# Patient Record
Sex: FEMALE | Race: WHITE
[De-identification: names, ages, dates, MRNs, and addresses within clinical notes are randomized per-mention and may not be internally consistent; named-entity substitution may affect disease eponyms.]

---

## 2018-04-15 ENCOUNTER — HOSPITAL ENCOUNTER (EMERGENCY)
Dept: HOSPITAL 25 - ED | Age: 53
Discharge: LEFT BEFORE DISCHARGE | End: 2018-04-15
Payer: COMMERCIAL

## 2018-04-15 DIAGNOSIS — M79.662: Primary | ICD-10-CM

## 2018-04-15 DIAGNOSIS — Z53.21: ICD-10-CM

## 2018-04-15 DIAGNOSIS — M79.661: ICD-10-CM

## 2018-04-15 LAB
HCT VFR BLD AUTO: 39 % (ref 35–47)
HGB BLD-MCNC: 13.7 G/DL (ref 12–16)
MCH RBC QN AUTO: 31 PG (ref 27–31)
MCHC RBC AUTO-ENTMCNC: 35 G/DL (ref 31–36)
MCV RBC AUTO: 89 FL (ref 80–97)
PLATELET # BLD AUTO: 228 10^3/UL (ref 150–450)
RBC # BLD AUTO: 4.4 10^6/UL (ref 4–5.4)
WBC # BLD AUTO: 3.4 10^3/UL (ref 3.5–10.8)

## 2018-04-15 PROCEDURE — 93005 ELECTROCARDIOGRAM TRACING: CPT

## 2018-04-15 PROCEDURE — 83735 ASSAY OF MAGNESIUM: CPT

## 2018-04-15 PROCEDURE — 80053 COMPREHEN METABOLIC PANEL: CPT

## 2018-04-15 PROCEDURE — 99283 EMERGENCY DEPT VISIT LOW MDM: CPT

## 2018-04-15 PROCEDURE — 85027 COMPLETE CBC AUTOMATED: CPT

## 2018-04-15 PROCEDURE — 36415 COLL VENOUS BLD VENIPUNCTURE: CPT

## 2018-04-15 PROCEDURE — 81003 URINALYSIS AUTO W/O SCOPE: CPT

## 2018-04-15 PROCEDURE — 84484 ASSAY OF TROPONIN QUANT: CPT

## 2018-04-15 PROCEDURE — 93970 EXTREMITY STUDY: CPT

## 2018-04-15 NOTE — RAD
HISTORY: Bilateral calf pain after a "long trip"



TECHNIQUE: Multiple transverse and longitudinal ultrasound images were obtained of the

veins of the bilateral lower extremities using grayscale, color Doppler, and spectral

Doppler imaging with and without compression and with augmentation. 



FINDINGS:



VEINS: The common femoral vein, deep femoral vein, femoral vein and popliteal vein are

compressible throughout their course, with normal flow on color Doppler imaging and normal

response to augmentation on spectral Doppler imaging.



SOFT TISSUES: Grossly normal. No large popliteal fossa cyst was identified.



IMPRESSION: 

No sonographic evidence of deep vein thrombosis.

## 2018-05-12 ENCOUNTER — HOSPITAL ENCOUNTER (EMERGENCY)
Dept: HOSPITAL 25 - ED | Age: 53
Discharge: HOME | End: 2018-05-12
Payer: COMMERCIAL

## 2018-05-12 VITALS — DIASTOLIC BLOOD PRESSURE: 62 MMHG | SYSTOLIC BLOOD PRESSURE: 112 MMHG

## 2018-05-12 DIAGNOSIS — Z88.6: ICD-10-CM

## 2018-05-12 DIAGNOSIS — R74.0: ICD-10-CM

## 2018-05-12 DIAGNOSIS — R79.1: ICD-10-CM

## 2018-05-12 DIAGNOSIS — Z88.3: ICD-10-CM

## 2018-05-12 DIAGNOSIS — R10.13: ICD-10-CM

## 2018-05-12 DIAGNOSIS — R17: Primary | ICD-10-CM

## 2018-05-12 DIAGNOSIS — Z88.5: ICD-10-CM

## 2018-05-12 LAB
BASOPHILS # BLD AUTO: 0 10^3/UL (ref 0–0.2)
EOSINOPHIL # BLD AUTO: 0 10^3/UL (ref 0–0.6)
HCT VFR BLD AUTO: 39 % (ref 35–47)
HGB BLD-MCNC: 13.6 G/DL (ref 12–16)
INR PPP/BLD: 1.07 (ref 0.77–1.02)
LYMPHOCYTES # BLD AUTO: 0.4 10^3/UL (ref 1–4.8)
MCH RBC QN AUTO: 31 PG (ref 27–31)
MCHC RBC AUTO-ENTMCNC: 35 G/DL (ref 31–36)
MCV RBC AUTO: 89 FL (ref 80–97)
MONOCYTES # BLD AUTO: 0.4 10^3/UL (ref 0–0.8)
NEUTROPHILS # BLD AUTO: 5.4 10^3/UL (ref 1.5–7.7)
NRBC # BLD AUTO: 0 10^3/UL
NRBC BLD QL AUTO: 0
PLATELET # BLD AUTO: 219 10^3/UL (ref 150–450)
RBC # BLD AUTO: 4.36 10^6/UL (ref 4–5.4)
WBC # BLD AUTO: 6.2 10^3/UL (ref 3.5–10.8)

## 2018-05-12 PROCEDURE — 80053 COMPREHEN METABOLIC PANEL: CPT

## 2018-05-12 PROCEDURE — 86308 HETEROPHILE ANTIBODY SCREEN: CPT

## 2018-05-12 PROCEDURE — 86140 C-REACTIVE PROTEIN: CPT

## 2018-05-12 PROCEDURE — 87086 URINE CULTURE/COLONY COUNT: CPT

## 2018-05-12 PROCEDURE — 81003 URINALYSIS AUTO W/O SCOPE: CPT

## 2018-05-12 PROCEDURE — 83880 ASSAY OF NATRIURETIC PEPTIDE: CPT

## 2018-05-12 PROCEDURE — 74177 CT ABD & PELVIS W/CONTRAST: CPT

## 2018-05-12 PROCEDURE — 93005 ELECTROCARDIOGRAM TRACING: CPT

## 2018-05-12 PROCEDURE — 84443 ASSAY THYROID STIM HORMONE: CPT

## 2018-05-12 PROCEDURE — 86665 EPSTEIN-BARR CAPSID VCA: CPT

## 2018-05-12 PROCEDURE — 71275 CT ANGIOGRAPHY CHEST: CPT

## 2018-05-12 PROCEDURE — 85379 FIBRIN DEGRADATION QUANT: CPT

## 2018-05-12 PROCEDURE — 83690 ASSAY OF LIPASE: CPT

## 2018-05-12 PROCEDURE — 85025 COMPLETE CBC W/AUTO DIFF WBC: CPT

## 2018-05-12 PROCEDURE — 86664 EPSTEIN-BARR NUCLEAR ANTIGEN: CPT

## 2018-05-12 PROCEDURE — 82553 CREATINE MB FRACTION: CPT

## 2018-05-12 PROCEDURE — 85610 PROTHROMBIN TIME: CPT

## 2018-05-12 PROCEDURE — 36415 COLL VENOUS BLD VENIPUNCTURE: CPT

## 2018-05-12 PROCEDURE — 96361 HYDRATE IV INFUSION ADD-ON: CPT

## 2018-05-12 PROCEDURE — 99284 EMERGENCY DEPT VISIT MOD MDM: CPT

## 2018-05-12 PROCEDURE — 81015 MICROSCOPIC EXAM OF URINE: CPT

## 2018-05-12 PROCEDURE — 82550 ASSAY OF CK (CPK): CPT

## 2018-05-12 PROCEDURE — 83735 ASSAY OF MAGNESIUM: CPT

## 2018-05-12 PROCEDURE — 85730 THROMBOPLASTIN TIME PARTIAL: CPT

## 2018-05-12 PROCEDURE — 80074 ACUTE HEPATITIS PANEL: CPT

## 2018-05-12 PROCEDURE — 83605 ASSAY OF LACTIC ACID: CPT

## 2018-05-12 PROCEDURE — 84484 ASSAY OF TROPONIN QUANT: CPT

## 2018-05-12 PROCEDURE — 96374 THER/PROPH/DIAG INJ IV PUSH: CPT

## 2018-05-12 RX ADMIN — SODIUM CHLORIDE ONE MLS/HR: 900 IRRIGANT IRRIGATION at 18:26

## 2018-05-12 NOTE — ED
Jeanine CROCKER Elizabeth, scribed for Savanna Marie MD on 05/12/18 at 1532 .





Abdominal Pain/Female





- HPI Summary


HPI Summary: 


This patient is a 52 year [facility] F [facility]  

with a chief complaint of severe, burning epigastric abd pain since 09:00 this 

morning. The patient rates the pain 9/10 in severity. Pt states that the pain 

was so severe she almost passed out and that she was screaming and crying in 

pain. Symptoms aggravated by nothing. Symptoms alleviated by lying down. 

Patient notes the abd pain feels acidic. Patient reports that she has had an 

episode like this before in 2015 and her LFT's were elevated. Pt is s/p bailey. 

Patient reports fever, nausea, mid-back pain, tremors, and shortness of breath. 

Patient denies changes in appetite and notes she was able to eat this morning. 

Patient notes that she took Prilosec, Zantac and Mylanta earlier today but her 

symptoms did not nora. Patient notes that she commonly gets heart palpitations 

but the cardiac stress test she took 5 years ago was normal. Patient notes that 

her most recent colonoscopy (6 years ago) was normal.








- History of Current Complaint


Chief Complaint: EDAbdPain


Stated Complaint: ABD PAIN


Time Seen by Provider: 05/12/18 15:04


Hx Obtained From: Patient, Medical Records - 2015 [facility] records, Dr. Sosa 4/ 2018 record


Pregnant?: No


Onset/Duration: Gradual Onset, Lasting Hours, Still Present


Timing: Constant


Severity Initially: Moderate


Severity Currently: Severe


Pain Intensity: 9


Pain Scale Used: 0-10 Numeric


Location: Epigastric


Radiates: Yes


Radiates to: Back


Character: Sharp, Burning


Aggravating Factor(s): Nothing


Alleviating Factor(s): Nothing


Associated Signs and Symptoms: Positive: Fever, Back Pain, Nausea, Other: - SOB

, tremors


Allergies/Adverse Reactions: 


 Allergies











Allergy/AdvReac Type Severity Reaction Status Date / Time


 


Adhesive Tape Allergy  Hives Verified 05/12/18 14:32


 


erythromycin base Allergy  Nausea And Verified 05/12/18 14:32





   Vomiting  


 


morphine Allergy  GI Upset Verified 05/12/18 14:32


 


NSAIDS (Non-Steroidal Allergy  GI Upset Verified 05/12/18 14:32





Anti-Inflamma     











Home Medications: 


 Home Medications





Cholecalciferol TAB* [Vitamin D TAB*] 1,000 unit PO DAILY 05/12/18 [History 

Confirmed 05/12/18]


L.acidoph,Paracasei, B.lactis [Probiotic] 1 each PO DAILY 05/12/18 [History 

Confirmed 05/12/18]











PMH/Surg Hx/FS Hx/Imm Hx


Previously Healthy: No - note SLE hx not revealed by pt until 5/13/18 after 

reading abt medications 


Endocrine/Hematology History: Reports: Hx Systemic Lupus Erythematosus - not 

treated, dormant, dx'd 21 years ago, was on prednisone & plaquenil 


   Denies: Hx Diabetes


Cardiovascular History: Reports: Other Cardiovascular Problems/Disorders - 

palpitations, neg stress test 2013 per pt 


   Denies: Hx Hypertension, Hx Pacemaker/ICD


Respiratory History: 


   Denies: Hx Asthma


GI History: Reports: Hx Gall Bladder Disease - s/p bailey , Hx Jaundice - with 

elevated LFT's 2015, no cause found per pt 


 History: Reports: Other  Problems/Disorders - ovarian cysts


   Denies: Hx Renal Disease


Sensory History: 


   Denies: Hx Hearing Aid


Psychiatric History: 


   Denies: Hx Panic Disorder





- Cancer History


Cancer Type, Location and Year: SKIN CA





- Surgical History


Surgery Procedure, Year, and Place: hysterectomy;.  2001 cholecystectomy at 

[place];.  SKIN BIOPSY


Infectious Disease History: No


Infectious Disease History: 


   Denies: Traveled Outside the [place] in Last 30 Days





- Family History


Known Family History: Positive: Hypertension, Other - diverticulitis, negative 

colon cancer, CHF (grandmother)





- Social History


Lives: With Family


Alcohol Use: Occasionally


Substance Use Type: Reports: None


Smoking Status (MU): Never Smoked Tobacco





Review of Systems


Positive: Fever


Positive: Palpitations


Positive: Shortness Of Breath


Positive: Abdominal Pain - epigastric, Nausea


Positive: no symptoms reported


Musculoskeletal: Other - back pain


Skin: Negative


Neurological: Other - tremors


Positive: Anxious


All Other Systems Reviewed And Are Negative: Yes





Physical Exam





- Summary


Physical Exam Summary: 


Appearance: Well-appearing, mod pain distress at present but describes severe 

pain distress, Well-nourished


Skin: Warm, color reflects adequate perfusion, anicteric 


Head: Normal Head/Face inspection, Atraumatic


Eyes: Conjunctiva clear, anicteric


ENT: Normal inspection


Neck: Supple, no nodes, no JVD.


Respiratory: Lungs clear, Normal breath sounds, no respiratory distress


Cardio: RRR, No murmur, pulses normal, brisk capillary refill


Abdomen: soft, tender epigastrium, no guarding, no rebound, no masses


Bowel sounds: present 


Musculoskeletal: Strength Intact/ ROM intact. No calf tenderness. No edema.


Psychological: Normal


Neuro: Alert, muscle tone normal, no focal deficit





Triage Information Reviewed: Yes


Vital Signs On Initial Exam: 


 Initial Vitals











Temp Pulse Resp BP Pulse Ox


 


 100.5 F   80   16   102/79   100 


 


 05/12/18 14:32  05/12/18 14:32  05/12/18 14:32  05/12/18 14:32  05/12/18 14:32











Vital Signs Reviewed: Yes





Diagnostics





- Vital Signs


 Vital Signs











  Temp Pulse Resp BP Pulse Ox


 


 05/12/18 14:32  100.5 F  80  16  102/79  100














- Laboratory


Lab Results: 


 Lab Results











  05/12/18 05/12/18 Range/Units





  14:58 14:58 


 


WBC  6.2   (3.5-10.8)  10^3/ul


 


RBC  4.36   (4.0-5.4)  10^6/ul


 


Hgb  13.6   (12.0-16.0)  g/dl


 


Hct  39   (35-47)  %


 


MCV  89   (80-97)  fL


 


MCH  31   (27-31)  pg


 


MCHC  35   (31-36)  g/dl


 


RDW  13   (10.5-15)  %


 


Plt Count  219   (150-450)  10^3/ul


 


MPV  7.3 L   (7.4-10.4)  um3


 


Neut % (Auto)  86.8 H   (38-83)  %


 


Lymph % (Auto)  6.5 L   (25-47)  %


 


Mono % (Auto)  6.3   (0-7)  %


 


Eos % (Auto)  0.1   (0-6)  %


 


Baso % (Auto)  0.3   (0-2)  %


 


Absolute Neuts (auto)  5.4   (1.5-7.7)  10^3/ul


 


Absolute Lymphs (auto)  0.4 L   (1.0-4.8)  10^3/ul


 


Absolute Monos (auto)  0.4   (0-0.8)  10^3/ul


 


Absolute Eos (auto)  0   (0-0.6)  10^3/ul


 


Absolute Basos (auto)  0   (0-0.2)  10^3/ul


 


Absolute Nucleated RBC  0   10^3/ul


 


Nucleated RBC %  0   


 


INR (Anticoag Therapy)   1.07 H  (0.77-1.02)  


 


APTT   27.9  (26.0-36.3)  seconds


 


D-Dimer, Quantitative   279 H  (Less Than 230)  ng/mL











Result Diagrams: 


 05/12/18 14:58





 05/12/18 14:58


Lab Statement: Any lab studies that have been ordered have been reviewed, and 

results considered in the medical decision making process.





- CT


  ** Chest/Abd/Pelvis


CT Interpretation: No Acute Changes - IMPRESSION: No evidence of pulmonary 

embolus is noted. No evidence of thoracic aortic dissection is noted.  Patient 

is status post cholecystectomy. No free fluid or other collections are noted. 

No evidence of biliary obstruction noted. Dr. Marie has reviewed this report.


CT Interpretation Completed By: Radiologist





- EKG


  ** 14:39


Cardiac Rate: NL - at 83 bpm


EKG Rhythm: Sinus Rhythm


ST Segment: Normal


Ectopy: None


EKG Interpretation: sinus rhythm at 83 bpm, nml AVIVCT, nml QTC, and nml axis


EKG Comparison: No Significant Change - when compared with EKG from 4/15/18





Re-Evaluation





- Re-Evaluation


  ** first re-eval


Re-Evaluation Time: 16:03


Change: Unchanged


Comment: Discussed lab results with patient and relayed gastroentereologist's 

recommendation for a Chest/Abd CT.





  ** second re-revaluation


Re-Evaluation Time: 17:20


Change: Unchanged


Comment: In room 17:20-17:35. Urged patient to agree to treatment, discussed 

risks of leaving AMA. Patient signed all AMA forms.





  ** third re-evaluation


Re-Evaluation Time: 17:57


Change: Unchanged


Comment: Patient was given a copy of lab reults. Patient still concerned about 

elevated D-Dimer. Patient was advised again that CTA Chest is needed to make 

further diagnosis.





  ** fourth re-evaluation


Re-Evaluation Time: 18:02


Change: Unchanged


Comment: Patient agrees to receive CT scans.





  ** fifth re-evaluation


Re-Evaluation Time: 20:55


Change: Improved


Comment: Patient tolerated a breakfast bar without vomiting, declines percocet 

and is agreeable with the discharge plan.





Abdominal Pain Fem Course/Dx





- Course


Course Of Treatment: An EKG at 14:39 reveals sinus rhythm at 83 bpm, nml AVIVCT

, nml QTC, and nml axis. No changes when compared with EKG taken on 4/15/18. 

Test results with no significant abnormalities except for abnormal LFTs, Tbili, 

glucose and D-Dimer. At 15:45 discussed patient care with Dr. Diaz, 

gastroenterologist, and she agreed with recommendation to do Chest/Abd/pelvis 

CTA. Patient was re-evaluated at 16:03 and lab results were discussed. Dr. Neal recommendation of a Abd/Pelvis CT and CTA Chest was relayed to the 

patient.  Patient declined IV morphine, CT Abd/Pelvis, and CTA Chest. Patient 

signed all AMA forms.  Discussed risks of leaving AMA with patient for 15+ 

minutes. Patient was re-evaluated a third time at 17:57 and given a copy of her 

lab results. At this time, patient was still concerned about the elevated d-

dimer results and possible blood clot and she was again advised that a CTA 

chest was needed to make a further diagnosis. In the ED course, patient was 

given PO Zofran. Patient was re-evaluated a fourth time at 18:02 and she agreed 

to receive CT scans. CTA Chest/Abd/Pelvis reveals, per radiologist, no evidence 

of pulmonary embolus is noted. No evidence of thoracic aortic dissection is 

noted. Patient is status post cholecystectomy. No free fluid or other 

collections are noted. No evidence of biliary obstruction noted. Discussed care 

of patient at 20:00 with Dr. Diaz, who thinks the patient's condition may be 

infectious (viral). She advises checking a mono and hepatitis panel for the 

patient. Dr. Diaz also suggests a prescription for pantoprazole and that the 

patient take pepcid for breakthrough pain. Dr. Diaz's office will call the 

patient on 5/14. Dr. Diaz advises the patient to try a trial of food prior to 

discharge. Patient was re-evaluated at 20:06 and CT results and discharge plan 

were discussed with patient.  Patient will be discharged home with diagnosis of 

jaundice, transaminitis, elevated d-dimer, abdominal pain. Upon re-evaluation 

at 20:55, patient tolerated a vean breakfast bar that she had with her, without 

vomiting, declines Percocet and is agreeable with the discharge plan.





- Diagnoses


Differential Diagnosis: Positive: Gall Bladder Disease, Hepatitis, Pancreatitis

, Other - choledocholithiasis, pancreatic CA, viral illness, inflammatory 

disease


Provider Diagnoses: 


 Jaundice, Abdominal pain, Transaminitis, Elevated d-dimer








- Provider Notifications


Discussed Care Of Patient With: Mariaa Diaz


Time Discussed With Above Provider: 15:45


Instructed by Provider To: Have Pt Call For Appt. - At 15:45 Dr. Diaz 

recommended doing a Chest/Abd/pelvis CTA. At 20:00 Dr. Diaz thinks the patient

's condition may be infectious. She advises checking a mono and hepatitis panel 

for the patient. Dr. Diaz also suggests a prescription for pantoprazole and 

that the patient take pepcid for breakthrough pain. Dr. Diaz's office will 

call the patient on Monday 5/14/18. Dr. Diaz advises the patient to try a 

trial of food prior to discharge.





Discharge





- Sign-Out/Discharge


Documenting (check all that apply): Discharge/Admit/Transfer - home 





- Discharge Plan


Condition: Stable


Disposition: HOME


Prescriptions: 


Famotidine TAB* [Pepcid 20 MG TAB*] 20 mg PO DAILY PRN #30 tab


 PRN Reason: Pain


oxyCODONE/Acetamin 5/325 MG* [Percocet 5/325 TAB*] 1 tab PO Q8H PRN #9 tab MDD 3


 PRN Reason: Severe Pain


Pantoprazole TAB (NF) [Protonix TAB (NF)] 40 mg PO BEDTIME #30 tab


Patient Education Materials:  Acute Abdominal Pain (ED), Jaundice (ED)


Referrals: 


Flo Beckford MD [Primary Care Provider] - 


Mariaa Diaz DO [Doctor of Osteopathy] - 2 Days


Additional Instructions: 


Your CTA did not show a pulmonary embolus or any dilation of the ducts in your 

liver or your biliary duct.  We have given you a copy of the CT report.  You 

were given percocet for pain, and pantoprazole IV for the acid reflux.  Dr. Diaz recommends that you stop the zantac and the omeprazole and she 

recommends pantoprazole 40mg at bedtime and then famotidine (pepcid) 20mg for 

breakthrough pain.  You may also use antacid for pain, but wait four hours 

after taking the pantoprazole before taking antacid.  We have prescribed 

percocet (oxycodone) that you may use sparingly for severe pain.  You may also 

take acetaminophen (Tylenol) for pain or fever.  We have sent a hepatitis panel

, and Bhanu Barr virus testing to see if they may be causing a viral 

hepatitis.  Dr. Diaz's office will call you on Monday 5/14/18 to arrange 

follow up which will most likely include an endoscopy, but an ERCP is not 

needed at this time.


Return to the ER if you have any new or worsening symptoms.  





- Billing Disposition and Condition


Condition: STABLE


Disposition: HOME





The documentation as recorded by the Jeanine guerrero Elizabeth accurately 

reflects the service I personally performed and the decisions made by me, Savanna Marie MD.

## 2018-05-12 NOTE — RAD
Indication: Elevated d-dimer, elevated bilirubin status post cholecystectomy.



Contrast: Administered 72.2 ml of OMNIPAQUE 350 mg/ml



CTA of the chest was performed after IV contrast administration. Coronal and sagittal

reconstructed images were obtained. No prior study is available for comparison.



The pulmonary arterial tree is well opacified. There are no filling defects present to

suggest pulmonary embolus. The thoracic aorta demonstrates no evidence of aortic

dissection or aneurysmal dilatation.



The trachea and major bronchi appear patent. The lung fields demonstrate no evidence of

alveolar consolidation or nodules. Scarring is noted. Right lung field is clear.



There is no mediastinal or hilar adenopathy noted. The visualized thoracic spine is

grossly unremarkable.



CT of the abdomen and pelvis demonstrates liver to be normal in size. No focal lesions or

intrahepatic duct dilatation is noted. The patient is status post cholecystectomy. The

common duct is not dilated. The pancreas demonstrates no mass or pancreatic duct

dilatation. The spleen is normal in size. No adrenal masses are noted. The kidneys

demonstrates no hydronephrosis. No retroperitoneal lymphadenopathy is noted. Aorta and

inferior vena cava are unremarkable.



CT of the pelvis demonstrates no retroperitoneal or pelvic lymphadenopathy. The urinary

bladder is unremarkable. No hernias are noted. No dilated loops of bowel are noted. The

colon is filled with stool. The patient appears to be status post hysterectomy. A

structure resembling the left ovary is noted with likely cyst in the left ovary measuring

up to 2.2 cm. The right ovary is grossly unremarkable. Aorta and inferior vena cava are

unremarkable.



IMPRESSION: No evidence of pulmonary embolus is noted. No evidence of thoracic aortic

dissection is noted.



Patient is status post cholecystectomy. No free fluid or other collections are noted. No

evidence of biliary obstruction noted.

## 2018-05-20 NOTE — ED
Progress





- Progress Note


Progress Note: 





Late entry 5/20/18, recalling phone conversation with pt on 5/13/18, approx 

noon.  Pt called ED with question about her prescription for pantoprazole which 

was prescribed by me yesterday.  Pt states that she read the drug information 

and she is concerned about taking it because she notes that pantoprazole can 

cause lupus, and she states she has lupus and doesn't want to make the lupus 

flare.


I took care of pt on 5/12/18, and spent significant time with patient as she 

was anxious about the radiation of a CT scan that was recommended due to her 

elevated LFTS and bilirubin s/p bailey. I reviewed prior records at that time, 

and gave patient results from 2015 when her LFT's and TBili were also elevated, 

but not as high as 5/12/18.  I also reviewed Dr. Sosa record when pt left AMA

, refusing EKG and further testing after an episode of chest pain after 

prednisone, after having neg venous doppler study of her leg.  None of those 

prior records listed a hx of systemic lupus erythematosus.  Pt does get medical 

care at [facility], but I am unable to see those records.  


I informed pt that omeprazole which she has taken in the past, also can cause 

SLE, so I advised that she should not take either omeprazole or pantoprazole. I 

reviewed uptod[employer] and [employer] to get this information and told her that the 

incidence was very low, but that it was not zero.  I advised her to proceed  

with the GI follow up, not take the pantoprazole or omeprazole, and to be sure 

to include SLE in her history when she is speaking with any health care 

provider.  I informed her that it was possible the abnormal LFT's and jaundice 

could be from a primary lupus flare, and that she also needed definite follow 

up with Dr. Beckford. Pt was undecided whether she was going to follow up 

with [facility], or [facility] at the time of the phone call.   





Re-Evaluation





- Re-Evaluation


  ** first re-eval


Re-Evaluation Time: 16:03


Change: Unchanged


Comment: Discussed lab results with patient and relayed gastroentereologist's 

recommendation for a Chest/Abd/pelvis CTA.





  ** second re-revaluation


Re-Evaluation Time: 17:20


Change: Unchanged


Comment: In room 17:20-17:35. Urged patient to agree to treatment, discussed 

risks of leaving AMA. Patient signed all AMA forms.





  ** third re-evaluation


Re-Evaluation Time: 17:57


Change: Unchanged


Comment: Patient was given a copy of lab reults. Patient still concerned about 

elevated D-Dimer. Patient was advised again that CTA Chest is needed to make 

further diagnosis of PE which could be indicated by elevated d dimer.





  ** fourth re-evaluation


Re-Evaluation Time: 18:02


Change: Unchanged


Comment: Patient agrees to receive CT scans.





  ** fifth re-evaluation


Re-Evaluation Time: 20:55


Change: Improved


Comment: Patient tolerated a breakfast bar without vomiting, declines percocet 

and is agreeable with the discharge plan.





Course/Dx





- Course


Course Of Treatment: An EKG at 14:39 reveals sinus rhythm at 83 bpm, nml AVIVCT

, nml QTC, and nml axis. No changes when compared with EKG taken on 4/15/18. 

Test results with no significant abnormalities except for abnormal LFTs and D-

Dimer. At 15:45 discussed patient care with Dr. Diaz, gastroenterologist, and 

they recommended doing a Chest/Abd CT. Patient was re-evaluated at 16:03 and 

lab results were discussed. Dr. Neal recommendation of a Abd/Pelvis CT and 

CTA Chest was relayed to the patient.  Patient declined IV morphine, CT Abd/

Pelvis, and CTA Chest. Patient signed all AMA forms.  Discussed risks of 

leaving AMA with patient for 15+ minutes. Patient was re-evaluated a third time 

at 17:57 and given a copy of her lab results. At this time, patient was still 

concerned about the elevated d-dimer results and she was again advised that a 

CTA chest was needed to make a further diagnosis. In the ED course, patient was 

given PO Zofran. Patient was re-evaluated a fourth time at 18:02 and she agreed 

to receive CT scans. CT Chest/Abd/Pelvis reveals, per radiologist, no evidence 

of pulmonary embolus is noted. No evidence of thoracic aortic dissection is 

noted. Patient is status post cholecystectomy. No free fluid or other 

collections are noted. No evidence of biliary obstruction noted. Discussed care 

of patient at 20:00 with Dr. Diaz, who thinks the patient's condition may be 

infectious. She advises checking a mono and hepatitis panel for the patient. 

Dr. Diaz also suggests a prescription for pantoprazole and that the patient 

take pepcid for breakthrough pain. Dr. Diaz's office will call the patient on 

5/14. Dr. Diaz advises the patient to try a trial of food prior to discharge. 

Patient was re-evaluated at 20:06 and CT results and discharge plan were 

discussed with patient. Patient will be discharged home with diagnosis of 

jaundice, transaminitis, elevated d-dimer, abdominal pain. Upon re-evaluation 

at 20:55, patient tolerated a breakfast bar without vomiting, declines Percocet 

and is agreeable with the discharge plan.  5/13/18: WITH NEW INFO ABOUT HX SLE, 

PT ADVISED TO HOLD PANTOPRAZOLE AND OMEPRAZOLE, AND HAVE DEFINITE GI FOLLOW UP, 

AND TO ADVISE ALL PROVIDERS OF HER HX SLE (EVEN THOUGH "DORMANT".)





- Diagnoses


Provider Diagnoses: 


 Jaundice, Abdominal pain, Transaminitis, Elevated d-dimer, Hx of systemic 

lupus erythematosus (SLE)








- Provider Notifications


Time Discussed With Above Provider: 15:45


Instructed by Provider To: Other - At 15:45 Dr. Diaz recommended doing a Chest

/Abd CT. At 20:00 Dr. Diaz thinks the patient's condition may be infectious. 

She advises checking a mono and hepatitis panel for the patient. Dr. Diaz 

also suggests a prescription for pantoprazole and that the patient take pepcid 

for breakthrough pain. Dr. Diaz's office will call the patient on Monday. Dr. Diaz advises the patient to try a trial of food prior to discharge.





Discharge





- Sign-Out/Discharge


Documenting (check all that apply): Post-Discharge Follow Up - pt now reports 

that she has hx systemic lupus erythematosus; this info added to pt's PMH in 5/ 12/18 chart. Pt advised to hold omeprazole and pantoprazole until seen by GI.  





- Discharge Plan


Condition: Stable


Disposition: HOME


Prescriptions: 


Famotidine TAB* [Pepcid 20 MG TAB*] 20 mg PO DAILY PRN #30 tab


 PRN Reason: Pain


oxyCODONE/Acetamin 5/325 MG* [Percocet 5/325 TAB*] 1 tab PO Q8H PRN #9 tab MDD 3


 PRN Reason: Severe Pain


Pantoprazole TAB (NF) [Protonix TAB (NF)] 40 mg PO BEDTIME #30 tab


Patient Education Materials:  Acute Abdominal Pain (ED), Jaundice (ED)


Referrals: 


Flo Beckford MD [Primary Care Provider] - 


Mariaa Diaz DO [Doctor of Osteopathy] - 2 Days


Additional Instructions: 


Your CTA did not show a pulmonary embolus or any dilation of the ducts in your 

liver or your biliary duct.  We have given you a copy of the CT report.  You 

were given percocet for pain, and pantoprazole IV for the acid reflux.  Dr. Diaz recommends that you stop the zantac and the omeprazole and she 

recommends pantoprazole 40mg at bedtime and then famotidine (pepcid) 20mg for 

breakthrough pain.  You may also use antacid for pain, but wait four hours 

after taking the pantoprazole before taking antacid.  We have prescribed 

percocet (oxycodone) that you may use sparingly for severe pain.  You may also 

take acetaminophen (Tylenol) for pain or fever.  We have sent a hepatitis panel

, and Bhanu Barr virus testing to see if they may be causing a viral 

hepatitis.  Dr. Diaz's office will call you on Monday 5/14/18 to arrange 

follow up which will most likely include an endoscopy, but an ERCP is not 

needed at this time.


Return to the ER if you have any new or worsening symptoms.  





- Billing Disposition and Condition


Condition: STABLE


Disposition: HOME